# Patient Record
(demographics unavailable — no encounter records)

---

## 2024-10-29 NOTE — HISTORY OF PRESENT ILLNESS
[FreeTextEntry1] :   60 yo here for av. She denies Pmb, no jt.Has osteopenia, due for dexa this year.  Her paternal Gm had breast cancer over age 50 and colon ca. [Mammogramdate] : 11/2023 [PapSmeardate] : 10/2023 [BoneDensityDate] : 11/2022 [TextBox_37] : osteopenia [ColonoscopyDate] : 2016

## 2024-10-29 NOTE — DISCUSSION/SUMMARY
[FreeTextEntry1] : Well woman exam  pap done mammo ordered bone density -ordered recommended taking vit. D 2000 units daily and through diet obtain 1200 mg of calcium along with 2.5 hours of weight bearing exercise per week return in 1 year

## 2024-10-29 NOTE — PHYSICAL EXAM
[Chaperone Declined] : Patient declined chaperone [Appropriately responsive] : appropriately responsive [Alert] : alert [No Acute Distress] : no acute distress [Soft] : soft [Non-tender] : non-tender [Non-distended] : non-distended [No HSM] : No HSM [No Lesions] : no lesions [No Mass] : no mass [Oriented x3] : oriented x3 [Examination Of The Breasts] : a normal appearance [No Masses] : no breast masses were palpable [Labia Majora] : normal [Labia Minora] : normal [Normal] : normal [Uterine Adnexae] : normal [FreeTextEntry9] : negative guac